# Patient Record
Sex: FEMALE | Race: WHITE | NOT HISPANIC OR LATINO | Employment: UNEMPLOYED | URBAN - METROPOLITAN AREA
[De-identification: names, ages, dates, MRNs, and addresses within clinical notes are randomized per-mention and may not be internally consistent; named-entity substitution may affect disease eponyms.]

---

## 2019-08-19 ENCOUNTER — HOSPITAL ENCOUNTER (EMERGENCY)
Facility: CLINIC | Age: 29
Discharge: HOME | End: 2019-08-19
Attending: NURSE PRACTITIONER | Admitting: NURSE PRACTITIONER
Payer: COMMERCIAL

## 2019-08-19 VITALS
TEMPERATURE: 98.5 F | OXYGEN SATURATION: 98 % | DIASTOLIC BLOOD PRESSURE: 89 MMHG | SYSTOLIC BLOOD PRESSURE: 137 MMHG | RESPIRATION RATE: 16 BRPM | HEART RATE: 84 BPM

## 2019-08-19 DIAGNOSIS — R11.10 VOMITING: ICD-10-CM

## 2019-08-19 DIAGNOSIS — R42 DIZZINESS: ICD-10-CM

## 2019-08-19 DIAGNOSIS — R51.9 HEADACHE: ICD-10-CM

## 2019-08-19 LAB
ALBUMIN SERPL-MCNC: 3.7 G/DL (ref 3.4–5)
ALP SERPL-CCNC: 93 U/L (ref 40–150)
ALT SERPL W P-5'-P-CCNC: 25 U/L (ref 0–50)
ANION GAP SERPL CALCULATED.3IONS-SCNC: 1 MMOL/L (ref 3–14)
AST SERPL W P-5'-P-CCNC: 28 U/L (ref 0–45)
BASOPHILS # BLD AUTO: 0.1 10E9/L (ref 0–0.2)
BASOPHILS NFR BLD AUTO: 0.5 %
BILIRUB SERPL-MCNC: 0.4 MG/DL (ref 0.2–1.3)
BUN SERPL-MCNC: 14 MG/DL (ref 7–30)
CALCIUM SERPL-MCNC: 9.3 MG/DL (ref 8.5–10.1)
CHLORIDE SERPL-SCNC: 105 MMOL/L (ref 94–109)
CO2 SERPL-SCNC: 30 MMOL/L (ref 20–32)
CREAT SERPL-MCNC: 0.62 MG/DL (ref 0.52–1.04)
DIFFERENTIAL METHOD BLD: ABNORMAL
EOSINOPHIL # BLD AUTO: 0.2 10E9/L (ref 0–0.7)
EOSINOPHIL NFR BLD AUTO: 1.7 %
ERYTHROCYTE [DISTWIDTH] IN BLOOD BY AUTOMATED COUNT: 11.6 % (ref 10–15)
GFR SERPL CREATININE-BSD FRML MDRD: >90 ML/MIN/{1.73_M2}
GLUCOSE SERPL-MCNC: 120 MG/DL (ref 70–99)
HCT VFR BLD AUTO: 43.4 % (ref 35–47)
HGB BLD-MCNC: 14.5 G/DL (ref 11.7–15.7)
IMM GRANULOCYTES # BLD: 0.1 10E9/L (ref 0–0.4)
IMM GRANULOCYTES NFR BLD: 0.4 %
LIPASE SERPL-CCNC: 273 U/L (ref 73–393)
LYMPHOCYTES # BLD AUTO: 3.1 10E9/L (ref 0.8–5.3)
LYMPHOCYTES NFR BLD AUTO: 26 %
MCH RBC QN AUTO: 32.5 PG (ref 26.5–33)
MCHC RBC AUTO-ENTMCNC: 33.4 G/DL (ref 31.5–36.5)
MCV RBC AUTO: 97 FL (ref 78–100)
MONOCYTES # BLD AUTO: 0.7 10E9/L (ref 0–1.3)
MONOCYTES NFR BLD AUTO: 5.7 %
NEUTROPHILS # BLD AUTO: 7.9 10E9/L (ref 1.6–8.3)
NEUTROPHILS NFR BLD AUTO: 65.7 %
NRBC # BLD AUTO: 0 10*3/UL
NRBC BLD AUTO-RTO: 0 /100
PLATELET # BLD AUTO: 323 10E9/L (ref 150–450)
POTASSIUM SERPL-SCNC: ABNORMAL MMOL/L (ref 3.4–5.3)
PROT SERPL-MCNC: 7.6 G/DL (ref 6.8–8.8)
RBC # BLD AUTO: 4.46 10E12/L (ref 3.8–5.2)
SODIUM SERPL-SCNC: 136 MMOL/L (ref 133–144)
TSH SERPL DL<=0.005 MIU/L-ACNC: 3.35 MU/L (ref 0.4–4)
WBC # BLD AUTO: 12.1 10E9/L (ref 4–11)

## 2019-08-19 PROCEDURE — 85025 COMPLETE CBC W/AUTO DIFF WBC: CPT | Performed by: NURSE PRACTITIONER

## 2019-08-19 PROCEDURE — 96374 THER/PROPH/DIAG INJ IV PUSH: CPT

## 2019-08-19 PROCEDURE — 99284 EMERGENCY DEPT VISIT MOD MDM: CPT

## 2019-08-19 PROCEDURE — 84443 ASSAY THYROID STIM HORMONE: CPT | Performed by: NURSE PRACTITIONER

## 2019-08-19 PROCEDURE — 83690 ASSAY OF LIPASE: CPT | Performed by: NURSE PRACTITIONER

## 2019-08-19 PROCEDURE — 80053 COMPREHEN METABOLIC PANEL: CPT | Performed by: NURSE PRACTITIONER

## 2019-08-19 PROCEDURE — 25000128 H RX IP 250 OP 636: Performed by: NURSE PRACTITIONER

## 2019-08-19 RX ORDER — LORAZEPAM 2 MG/ML
0.5 INJECTION INTRAMUSCULAR ONCE
Status: DISCONTINUED | OUTPATIENT
Start: 2019-08-19 | End: 2019-08-19 | Stop reason: HOSPADM

## 2019-08-19 RX ADMIN — PROCHLORPERAZINE EDISYLATE 10 MG: 5 INJECTION INTRAMUSCULAR; INTRAVENOUS at 18:49

## 2019-08-19 RX ADMIN — SODIUM CHLORIDE 1000 ML: 9 INJECTION, SOLUTION INTRAVENOUS at 18:51

## 2019-08-19 ASSESSMENT — ENCOUNTER SYMPTOMS
DIAPHORESIS: 1
VOMITING: 1
HEADACHES: 1
DIZZINESS: 1
ABDOMINAL PAIN: 0
DIARRHEA: 0
NAUSEA: 1

## 2019-08-19 NOTE — ED TRIAGE NOTES
Patient sent to the ED from urgent care for double vision. Patient states over the weekend has had troubles with intermittent double vision and hot flashes. Today double vision has been more constant and has had increased hot flashes and vomiting. Eating burger jonathan on arrival. No emesis.

## 2019-08-19 NOTE — ED PROVIDER NOTES
"  History     Chief Complaint:  Lightheadedness; Visual Disturbance    HPI   Kanika Walsh is a 28 year old female with a history of anxiety who presents with lightheadedness and visual disturbance. She states that she has had rashes on her right arm that have since improved. The patient reports that she has been having hot flashes, diaphoresis, and vomiting for the past two weeks that are getting progressively worse. She said that, today, she has \"vomited 20 times\" and is experiencing diplopia today, prompting her visit to the ED. She reports that she does not have diplopia here in the ED, but she is nauseous and dizzy. She also remarks that she has a mild headache on the right side of her head. She said that she was given Zofran from her headaches, but stopped taking it because it did not help. The patient denies abdominal pain or diarrhea. She denies drinking alcohol, medication changes, as well the possibility of being pregnant. The patient remarks that she fell at work recently but denies head trauma. She also states that she was vomiting while camping this weekend and tried marijuana, which helped slightly.     Allergies:  Latex     Medications:    Zofran  Cyclobenzaprine HCL    Robaxin  Medrol dosepak  Sertraline HCL  Tramadol HCL    Past Medical History:    Anxiety    Past Surgical History:    Abdomen surgery  Cholecystectomy     Family History:    No past pertinent family history.    Social History:  Smoking Status: Never Smoker  Smokeless Tobacco: Never Used  Alcohol Use: Negative  Drug Use: Positive, marijuana   Marital Status:         Review of Systems   Constitutional: Positive for diaphoresis.   HENT: Negative for congestion.    Eyes: Positive for visual disturbance. Negative for photophobia.   Respiratory: Negative for shortness of breath.    Cardiovascular: Negative for chest pain and palpitations.   Gastrointestinal: Positive for nausea and vomiting. Negative for abdominal pain and " diarrhea.   Genitourinary: Negative for dysuria, urgency and vaginal bleeding.   Neurological: Positive for dizziness and headaches. Negative for seizures, weakness and numbness.   All other systems reviewed and are negative.      Physical Exam     Patient Vitals for the past 24 hrs:   BP Temp Pulse Resp SpO2   08/19/19 1646 137/89 98.5  F (36.9  C) 84 16 98 %     Physical Exam  General:  Alert, mild discomfort, actively retching, well kept  HEENT:  Normal Voice, PERRL, EOM normal, oropharynx is normal, Uvula is midline, Conjunctivae and sclerae are normal, No lymphadenopathy    Neck: Normal range of motion, No meningismus. There is no midline cervical spine pain/tenderness. No mass is detected  CV:  Regular rate and underlying rhythm, Normal Peripheral pulses. No murmurs, rubs or clicks  Resp: Lungs are clear, No tachypnea, Non-labored breathing, No wheezing, crackles, or rales   GI:  Abdomen is soft, there is no rigidity, No distension, No tympani, No rebound tenderness, Non-surgical without peritoneal features    MS:  No major joint effusions, No asymmetric leg swelling. No calf tenderness  Skin:  No rash or acute skin lesions noted, Warm, Dry  Neuro:    National Institutes of Health Stroke Scale   Score    Level of consciousness: 0    LOC questions: 0    LOC commands: 0    Best gaze: 0    Visual: 0    Facial palsy: 0    Motor arm (left): 0    Motor arm (right): 0    Motor leg (left): 0    Motor leg (right): 0    Limb ataxia: 0    Sensory: 0    Best language: 0    Dysarthria: 0    Extinction and inattention: 0        Total Score:  0   Psych:  Awake. Alert.  Normal affect.  Appropriate interactions. Good eye contact    Emergency Department Course     Laboratory:  CBC: WBC: 12.1 (H), HGB 14.5, PLT: 323    Lipase: 273    TSH with free T4 reflex: 3.35    CMP: Glucose 120 (H), Anion Gap 1 (L), o/w WNL (Creatinine: 0.62)    Potassium: pending at time of patient's departure    Interventions:  1851 NS 1000 mL IV  Bolus  1849 Compazine 10 mg injection    Emergency Department Course:  Past medical records, nursing notes, and vitals reviewed.  1809: I performed an exam of the patient and obtained history, as documented above.    1914 I rechecked and updated the patient.    IV was inserted and blood was drawn for laboratory testing, results above.    1919 The patient is leaving Against Medical Advice:    At this point, the patient refused my recommended care and insisted upon discharge. I discussed with the patient and family my diagnostic impression, recommended treatment, and alternatives to treatment. I discussed with them what I anticipated could happen if the patient were discharged. The patient was able to understand this explanation and ask appropriate questions. The patient indicates s/he wants to leave because of refusal of imaging.  I believe this patient does have capacity to decide to leave Against Medical Advice, and I have asked her to sign a form indicating that decision. I have given the patient thorough discharge instructions and have prescribed all appropriate treatment, given the stage of the work up conducted.  I have reminded the patient to return here at any time if she decides to have further evaluation or treatment.    I personally reviewed the laboratory results with the Patient and answered all related questions prior to leaving AMA.    Impression & Plan      Medical Decision Making:  Kanika Walsh is a 28 year old female who presents today for evaluation of nausea vomiting and dizziness.  She states she has had some intermittent symptoms over the last couple of weeks however over the last day it has significantly worsened including double vision.  Her examination showed no focal neurologic deficit.  She did not have any visual disturbances my exam.  Due to the duration of her symptoms and the inability to provoke symptoms with head movement I was concerned about CVA.  After discussing plan for  evaluation of patient and need for MRI studies were initiated however patient as she was being wheeled down to MRI decided that she did not want any further evaluation or testing.  She stated she was feeling significantly improved and did not feel that further testing would help.  I had a very thorough discussion including life or death as well as loss of use of limb or speech conversation about the possibility of a CVA.  Patient accepts the responsibility for this and request to be discharged home.  Her laboratory studies showed minimally elevated white count and glucose.  Labs were otherwise noncontributory.  Again patient has signed paperwork in his left AMA.      Diagnosis:    ICD-10-CM    1. Dizziness R42    2. Vomiting R11.10    3. Headache R51        Disposition:  Left AMA.    IRosanne, am serving as a scribe on 8/19/2019 at 7:19 PM to personally document services performed by Jhonatan Park APRN* based on my observations and the provider's statements to me.     IBarrera, am serving as a scribe on 8/19/2019 at 7:47 PM to personally document services performed by No att. providers found based on my observations and the provider's statements to me.     Rosanne Blevins  8/19/2019   [facility] EMERGENCY DEPARTMENT       Jhonatan Park APRN CNP  08/20/19 0232

## 2019-08-20 ASSESSMENT — ENCOUNTER SYMPTOMS
WEAKNESS: 0
NUMBNESS: 0
SHORTNESS OF BREATH: 0
SEIZURES: 0
DYSURIA: 0
PALPITATIONS: 0
PHOTOPHOBIA: 0

## 2019-08-20 NOTE — ED NOTES
Patient states she does not want to follow through with any more testing and wishes to leave now. Her provider Jhonatan Emanuels to the room to discuss with patient who maintains she is feeling better and does not wish to receive any further testing.

## 2022-07-18 ENCOUNTER — HOSPITAL ENCOUNTER (EMERGENCY)
Facility: CLINIC | Age: 32
Discharge: HOME | End: 2022-07-18
Attending: EMERGENCY MEDICINE | Admitting: EMERGENCY MEDICINE
Payer: COMMERCIAL

## 2022-07-18 VITALS
HEART RATE: 88 BPM | DIASTOLIC BLOOD PRESSURE: 94 MMHG | SYSTOLIC BLOOD PRESSURE: 117 MMHG | OXYGEN SATURATION: 100 % | TEMPERATURE: 97.5 F | RESPIRATION RATE: 18 BRPM

## 2022-07-18 DIAGNOSIS — R10.84 ABDOMINAL PAIN, GENERALIZED: ICD-10-CM

## 2022-07-18 DIAGNOSIS — R11.2 NAUSEA AND VOMITING, INTRACTABILITY OF VOMITING NOT SPECIFIED, UNSPECIFIED VOMITING TYPE: ICD-10-CM

## 2022-07-18 DIAGNOSIS — F12.90 MARIJUANA USE: ICD-10-CM

## 2022-07-18 LAB
ALBUMIN SERPL-MCNC: 4 G/DL (ref 3.4–5)
ALP SERPL-CCNC: 86 U/L (ref 40–150)
ALT SERPL W P-5'-P-CCNC: 30 U/L (ref 0–50)
ANION GAP SERPL CALCULATED.3IONS-SCNC: 7 MMOL/L (ref 3–14)
AST SERPL W P-5'-P-CCNC: 23 U/L (ref 0–45)
BASOPHILS # BLD AUTO: 0 10E3/UL (ref 0–0.2)
BASOPHILS NFR BLD AUTO: 0 %
BILIRUB SERPL-MCNC: 0.6 MG/DL (ref 0.2–1.3)
BUN SERPL-MCNC: 13 MG/DL (ref 7–30)
CALCIUM SERPL-MCNC: 9.9 MG/DL (ref 8.5–10.1)
CHLORIDE BLD-SCNC: 105 MMOL/L (ref 94–109)
CO2 SERPL-SCNC: 28 MMOL/L (ref 20–32)
CREAT SERPL-MCNC: 0.66 MG/DL (ref 0.52–1.04)
EOSINOPHIL # BLD AUTO: 0.2 10E3/UL (ref 0–0.7)
EOSINOPHIL NFR BLD AUTO: 2 %
ERYTHROCYTE [DISTWIDTH] IN BLOOD BY AUTOMATED COUNT: 11.1 % (ref 10–15)
FLUAV RNA SPEC QL NAA+PROBE: NEGATIVE
FLUBV RNA RESP QL NAA+PROBE: NEGATIVE
GFR SERPL CREATININE-BSD FRML MDRD: >90 ML/MIN/1.73M2
GLUCOSE BLD-MCNC: 92 MG/DL (ref 70–99)
HCG SERPL QL: NEGATIVE
HCT VFR BLD AUTO: 44.7 % (ref 35–47)
HGB BLD-MCNC: 15.1 G/DL (ref 11.7–15.7)
HOLD SPECIMEN: NORMAL
IMM GRANULOCYTES # BLD: 0 10E3/UL
IMM GRANULOCYTES NFR BLD: 0 %
LIPASE SERPL-CCNC: 255 U/L (ref 73–393)
LYMPHOCYTES # BLD AUTO: 2.1 10E3/UL (ref 0.8–5.3)
LYMPHOCYTES NFR BLD AUTO: 22 %
MCH RBC QN AUTO: 33 PG (ref 26.5–33)
MCHC RBC AUTO-ENTMCNC: 33.8 G/DL (ref 31.5–36.5)
MCV RBC AUTO: 98 FL (ref 78–100)
MONOCYTES # BLD AUTO: 0.8 10E3/UL (ref 0–1.3)
MONOCYTES NFR BLD AUTO: 9 %
NEUTROPHILS # BLD AUTO: 6.2 10E3/UL (ref 1.6–8.3)
NEUTROPHILS NFR BLD AUTO: 67 %
NRBC # BLD AUTO: 0 10E3/UL
NRBC BLD AUTO-RTO: 0 /100
PLATELET # BLD AUTO: 279 10E3/UL (ref 150–450)
POTASSIUM BLD-SCNC: 4 MMOL/L (ref 3.4–5.3)
PROT SERPL-MCNC: 7.8 G/DL (ref 6.8–8.8)
RBC # BLD AUTO: 4.58 10E6/UL (ref 3.8–5.2)
RSV RNA SPEC NAA+PROBE: NEGATIVE
SARS-COV-2 RNA RESP QL NAA+PROBE: NEGATIVE
SODIUM SERPL-SCNC: 140 MMOL/L (ref 133–144)
WBC # BLD AUTO: 9.3 10E3/UL (ref 4–11)

## 2022-07-18 PROCEDURE — 80053 COMPREHEN METABOLIC PANEL: CPT | Performed by: EMERGENCY MEDICINE

## 2022-07-18 PROCEDURE — 85025 COMPLETE CBC W/AUTO DIFF WBC: CPT | Performed by: EMERGENCY MEDICINE

## 2022-07-18 PROCEDURE — 87637 SARSCOV2&INF A&B&RSV AMP PRB: CPT

## 2022-07-18 PROCEDURE — 36415 COLL VENOUS BLD VENIPUNCTURE: CPT | Performed by: EMERGENCY MEDICINE

## 2022-07-18 PROCEDURE — 83690 ASSAY OF LIPASE: CPT | Performed by: EMERGENCY MEDICINE

## 2022-07-18 PROCEDURE — 82040 ASSAY OF SERUM ALBUMIN: CPT | Performed by: EMERGENCY MEDICINE

## 2022-07-18 PROCEDURE — 99283 EMERGENCY DEPT VISIT LOW MDM: CPT | Mod: CS

## 2022-07-18 PROCEDURE — 250N000011 HC RX IP 250 OP 636: Performed by: EMERGENCY MEDICINE

## 2022-07-18 PROCEDURE — C9803 HOPD COVID-19 SPEC COLLECT: HCPCS

## 2022-07-18 PROCEDURE — 258N000003 HC RX IP 258 OP 636: Performed by: EMERGENCY MEDICINE

## 2022-07-18 PROCEDURE — 84703 CHORIONIC GONADOTROPIN ASSAY: CPT

## 2022-07-18 RX ORDER — ONDANSETRON 2 MG/ML
4 INJECTION INTRAMUSCULAR; INTRAVENOUS ONCE
Status: COMPLETED | OUTPATIENT
Start: 2022-07-18 | End: 2022-07-18

## 2022-07-18 RX ORDER — ONDANSETRON 4 MG/1
4 TABLET, ORALLY DISINTEGRATING ORAL EVERY 8 HOURS PRN
Qty: 10 TABLET | Refills: 0 | Status: SHIPPED | OUTPATIENT
Start: 2022-07-18

## 2022-07-18 RX ADMIN — SODIUM CHLORIDE 1000 ML: 9 INJECTION, SOLUTION INTRAVENOUS at 15:39

## 2022-07-18 RX ADMIN — ONDANSETRON 4 MG: 2 INJECTION INTRAMUSCULAR; INTRAVENOUS at 15:39

## 2022-07-18 ASSESSMENT — ENCOUNTER SYMPTOMS
SHORTNESS OF BREATH: 0
FEVER: 0
COUGH: 0
ABDOMINAL PAIN: 1
NAUSEA: 1
DYSURIA: 0
CONSTIPATION: 0
FLANK PAIN: 0
BLOOD IN STOOL: 0
VOMITING: 1
BACK PAIN: 0

## 2022-07-18 NOTE — ED TRIAGE NOTES
Pt presents with right sided abdominal pain with concern for pancreatitis, also c/o vomiting that began this morning. Past hx of pancreatitis after gallbladder removal. Pt actively dry heaving and vomiting during triage. Pt alert, oriented x3 aBCs intact     Triage Assessment     Row Name 07/18/22 1531       Triage Assessment (Adult)    Airway WDL WDL       Respiratory WDL    Respiratory WDL WDL       Skin Circulation/Temperature WDL    Skin Circulation/Temperature WDL WDL       Cardiac WDL    Cardiac WDL WDL       Peripheral/Neurovascular WDL    Peripheral Neurovascular WDL WDL

## 2022-07-19 NOTE — ED PROVIDER NOTES
History   Chief Complaint:  Abdominal Pain       HPI   Kanika Walsh is a 31 year old female with a history of pancreatitis who presents with abdominal pain, nausea and vomiting.  Patient has a past abdominal surgical history of cholecystectomy and 2 C-sections.  She states she felt a flare of abdominal pain for the past 4 days.  However, yesterday the pain worsened.  She points to her right upper quadrant below her rib cage as to where she is having the worst pain.  She began to vomit at 5:15 AM until about 2 hours ago.  She has had a couple loose bowel movements.  No blood in her stool or in her vomit.  She describes her abdominal pain as a dull pain and states it is worse with eating and worse with movement.  No recent travel.  No recent antibiotic use.  She denies any alcohol use. Uses marijuana regularly.  Denies any chest pain or shortness of breath.  No recent coughs, colds, fevers.  No urinary symptoms.    Review of Systems   Constitutional: Negative for fever.   HENT: Negative for congestion.    Respiratory: Negative for cough and shortness of breath.    Cardiovascular: Negative for chest pain and leg swelling.   Gastrointestinal: Positive for abdominal pain, nausea and vomiting. Negative for blood in stool and constipation.   Genitourinary: Negative for dysuria, flank pain, menstrual problem, pelvic pain and urgency.   Musculoskeletal: Negative for back pain.   Skin: Negative for rash.   Neurological: Negative for syncope.   All other systems reviewed and are negative.    Allergies:  No known drug allergies     Medications:  Sertraline  Alprazolam  Adderall  Levothyroxine    Past Medical History:     Attention deficit disorder  Diplopia  Excessive and frequent menstruation with irregular cycle  Insomnia  Pancreatitis   Hypothyroidism  Pure hypercholesterolemia  Generalized anxiety disorder     Past Surgical History:    Cholecystectomy      Social History:    Denies alcohol or drug use  Uses  marijuana  PCP: Rigoberto Chatman MD    Physical Exam     Patient Vitals for the past 24 hrs:   BP Temp Temp src Pulse Resp SpO2   07/18/22 2153 121/68 -- -- 86 20 98 %   07/18/22 1531 101/81 97.5  F (36.4  C) Temporal 100 16 96 %       Physical Exam  General: Friendly adult female sitting on [facility].  HENT: Patient wearing face mask. When taken off, mucous membranes appear moist.  Eyes: Conjunctive and sclera clear.  CV: Regular rate and rhythm. Normal S1, S2. No appreciable murmurs, gallops or rubs.  Resp: Lungs clear to auscultation bilaterally. Normal respiratory effort. Speaks in full sentences. No stridor or cough observed.  GI: Abdomen soft, non distended and nontender. No rebound or guarding.  MSK: Moves all extremities without difficulty. No lower extremity edema.  Skin: Warm and dry.  Neuro: Awake, alert, oriented. Cranial nerves grossly intact.  Psych: Cooperative. Normal affect.      Emergency Department Course     Laboratory:  Labs Ordered and Resulted from Time of ED Arrival to Time of ED Departure   LIPASE - Normal       Result Value    Lipase 255     COMPREHENSIVE METABOLIC PANEL - Normal    Sodium 140      Potassium 4.0      Chloride 105      Carbon Dioxide (CO2) 28      Anion Gap 7      Urea Nitrogen 13      Creatinine 0.66      Calcium 9.9      Glucose 92      Alkaline Phosphatase 86      AST 23      ALT 30      Protein Total 7.8      Albumin 4.0      Bilirubin Total 0.6      GFR Estimate >90     HCG QUALITATIVE PREGNANCY - Normal    hCG Serum Qualitative Negative     CBC WITH PLATELETS AND DIFFERENTIAL    WBC Count 9.3      RBC Count 4.58      Hemoglobin 15.1      Hematocrit 44.7      MCV 98      MCH 33.0      MCHC 33.8      RDW 11.1      Platelet Count 279      % Neutrophils 67      % Lymphocytes 22      % Monocytes 9      % Eosinophils 2      % Basophils 0      % Immature Granulocytes 0      NRBCs per 100 WBC 0      Absolute Neutrophils 6.2      Absolute Lymphocytes 2.1      Absolute  Monocytes 0.8      Absolute Eosinophils 0.2      Absolute Basophils 0.0      Absolute Immature Granulocytes 0.0      Absolute NRBCs 0.0     INFLUENZA A/B & SARS-COV2 PCR MULTIPLEX        Emergency Department Course:    Reviewed:  I reviewed nursing notes, vitals, past medical history and Care Everywhere    Assessments:  2137 I obtained history and examined the patient as noted above.     1023    I rechecked the patient and explained findings.     Interventions:  Medications   0.9% sodium chloride BOLUS (0 mLs Intravenous Stopped 7/18/22 2213)   ondansetron (ZOFRAN) injection 4 mg (4 mg Intravenous Given 7/18/22 1539)     Disposition:  The patient was discharged to home.     Impression & Plan     Medical Decision Making:  Kanika is a pleasant 31-year-old female with a past medical history of pancreatitis who is status post cholecystectomy and presented tonight to the emergency department for evaluation of abdominal pain, nausea and vomiting.  On arrival vital signs were unremarkable.  On exam, she had a benign abdomen with no focal tenderness.  Basic lab studies were obtained.  Pregnancy test negative.  CBC with no leukocytosis or anemia.  Normal kidney function, liver function and electrolytes on metabolic panel.  Lipase was normal, reassuring she does not have another episode of pancreatitis.  COVID test pending and she will check this on [facility].  Given her benign abdominal exam, I did not pursue advanced imaging, as I felt the risk of radiation outweighed the benefit.  The patient was able to tolerate oral intake after Zofran here in the emergency department.  I prescribed her a course of Zofran to be used as an outpatient.  Her nausea and vomiting could likely be due to gastroenteritis or potentially even related to her regular marijuana use.  I felt she was stable for discharge home.  We discussed close return precautions and she will return to the emergency department if she develops fever, worsening/focal  abdominal pain, blood in her stools, worsening nausea and vomiting not responsive to Zofran, or anything else new or concerning.  All questions were answered prior to discharge.  My attending [facility]    Diagnosis:    ICD-10-CM    1. Abdominal pain, generalized  R10.84    2. Nausea and vomiting, intractability of vomiting not specified, unspecified vomiting type  R11.2    3. Marijuana use  F12.90        Discharge Medications:  New Prescriptions    ONDANSETRON (ZOFRAN ODT) 4 MG ODT TAB    Take 1 tablet (4 mg) by mouth every 8 hours as needed for nausea       Scribe Disclosure:  I, Mihaela Spivey, am serving as a scribe at 9:37 PM on 7/18/2022 to document services personally performed by Arlette Yin PA-C based on my observations and the provider's statements to me.     Arlette Yin PA-C  EPPA APC-T  I saw and evaluated this patient under attending provider Dr. Fischer.           Arlette Yin PA-C  07/18/22 3233

## 2022-07-19 NOTE — ED PROVIDER NOTES
ED ATTENDING PHYSICIAN NOTE:   I evaluated this patient in conjunction with RASHI Muñoz. I have participated in the care of the patient and personally performed key elements of the history, exam, and medical decision making.      HPI:   Kanika Walsh is a 31 year old female presents with concerns for pancreatitis given RUQ abdominal pain and nausea that has been very minimal over the last 4-5 days, but worsened yesterday. She began vomiting at 0515 this morning, and her last episode was about 2 hours prior to her arrival here. The abdominal pain is constant and dull and worsens with eating and movement. She has had slightly looser bowel movements, but there is no blood in her stool. Her abdomen is not distended and she is passing gas. She has a history of pancreatitis after her cholecystectomy.    EXAM:   BP (!) 117/94   Pulse 88   Temp 97.5  F (36.4  C) (Temporal)   Resp 18   SpO2 100%   General: Resting comfortably on the gurney  Head:  The scalp, face, and head appear normal  Eyes:  The pupils are normal    Conjunctivae and sclera appear normal  ENT:    The nose is normal    Ears/pinnae are normal  Neck:  Normal range of motion  Resp:  The lungs are clear. No resp distress.  Abd:  No tenderness.  Abdomen is not distended.  No CVA tenderness.  Heart:  Regular rate, normal rhythm, no murmur  Skin:  No rash or acute lesions noted.  Neuro: Speech is normal and fluent.  No focal deficits.  Psych:  Awake. Alert.  Normal affect.  Appropriate interactions    Labs Ordered and Resulted from Time of ED Arrival to Time of ED Departure   LIPASE - Normal       Result Value    Lipase 255     COMPREHENSIVE METABOLIC PANEL - Normal    Sodium 140      Potassium 4.0      Chloride 105      Carbon Dioxide (CO2) 28      Anion Gap 7      Urea Nitrogen 13      Creatinine 0.66      Calcium 9.9      Glucose 92      Alkaline Phosphatase 86      AST 23      ALT 30      Protein Total 7.8      Albumin 4.0      Bilirubin Total  0.6      GFR Estimate >90     HCG QUALITATIVE PREGNANCY - Normal    hCG Serum Qualitative Negative     INFLUENZA A/B & SARS-COV2 PCR MULTIPLEX - Normal    Influenza A PCR Negative      Influenza B PCR Negative      RSV PCR Negative      SARS CoV2 PCR Negative     CBC WITH PLATELETS AND DIFFERENTIAL    WBC Count 9.3      RBC Count 4.58      Hemoglobin 15.1      Hematocrit 44.7      MCV 98      MCH 33.0      MCHC 33.8      RDW 11.1      Platelet Count 279      % Neutrophils 67      % Lymphocytes 22      % Monocytes 9      % Eosinophils 2      % Basophils 0      % Immature Granulocytes 0      NRBCs per 100 WBC 0      Absolute Neutrophils 6.2      Absolute Lymphocytes 2.1      Absolute Monocytes 0.8      Absolute Eosinophils 0.2      Absolute Basophils 0.0      Absolute Immature Granulocytes 0.0      Absolute NRBCs 0.0       No orders to display        MEDICAL DECISION MAKING/ASSESSMENT AND PLAN:   Kanika Walsh is a 31 year old female who presents for evaluation of nausea and vomiting with mild abdominal pain in a nonfocal abdominal exam. There are no ill contacts. I considered a broad differential diagnosis for this patient including viral gastroenteritis, food poisoning, bowel obstruction, intra-abdominal infection such as colitis, cholecystitis, UTI, pyelonephritis, appendicitis, etc.  Doubt new onset DKA. Doubt brain malignancy or increased ICP.     She no signs of worrisome intra-abdominal pathologies detected during the visit today. She has a completely benign abdominal exam without rebound, guarding, or marked tenderness to palpation. Supportive outpatient management is therefore indicated.  Abdominal pain precautions are given for home. No indication for CT at this time.  It was discussed to return to the ED for blood in stool, increasing pain, or fevers more than 102. She feels much improved after interventions in ED. She passed oral challenge prior to d/c.     DIAGNOSIS:     ICD-10-CM    1. Abdominal pain,  generalized  R10.84    2. Nausea and vomiting, intractability of vomiting not specified, unspecified vomiting type  R11.2    3. Marijuana use  F12.90         DISPOSITION:   The patient was discharged to home     7/18/2022  [facility] EMERGENCY DEPT           Ced Fischer MD  07/18/22 4175

## 2025-06-23 ENCOUNTER — HOSPITAL ENCOUNTER (EMERGENCY)
Facility: CLINIC | Age: 35
Discharge: HOME | End: 2025-06-23
Attending: EMERGENCY MEDICINE | Admitting: EMERGENCY MEDICINE
Payer: COMMERCIAL

## 2025-06-23 VITALS
RESPIRATION RATE: 20 BRPM | SYSTOLIC BLOOD PRESSURE: 133 MMHG | TEMPERATURE: 98.2 F | OXYGEN SATURATION: 97 % | HEART RATE: 63 BPM | DIASTOLIC BLOOD PRESSURE: 86 MMHG

## 2025-06-23 DIAGNOSIS — K52.9 ACUTE GASTROENTERITIS: ICD-10-CM

## 2025-06-23 LAB
ABO + RH BLD: NORMAL
ADV 40+41 DNA STL QL NAA+NON-PROBE: NEGATIVE
ALBUMIN SERPL BCG-MCNC: 4.8 G/DL (ref 3.5–5.2)
ALBUMIN UR-MCNC: NEGATIVE MG/DL
ALP SERPL-CCNC: 76 U/L (ref 40–150)
ALT SERPL W P-5'-P-CCNC: 40 U/L (ref 0–50)
ANION GAP SERPL CALCULATED.3IONS-SCNC: 17 MMOL/L (ref 7–15)
APPEARANCE UR: CLEAR
AST SERPL W P-5'-P-CCNC: 31 U/L (ref 0–45)
ASTRO TYP 1-8 RNA STL QL NAA+NON-PROBE: NEGATIVE
BACTERIA #/AREA URNS HPF: ABNORMAL /HPF
BASOPHILS # BLD AUTO: 0 10E3/UL (ref 0–0.2)
BASOPHILS NFR BLD AUTO: 0 %
BILIRUB SERPL-MCNC: 0.7 MG/DL
BILIRUB UR QL STRIP: NEGATIVE
BLD GP AB SCN SERPL QL: NEGATIVE
BUN SERPL-MCNC: 8.4 MG/DL (ref 6–20)
C CAYETANENSIS DNA STL QL NAA+NON-PROBE: NEGATIVE
CALCIUM SERPL-MCNC: 9.9 MG/DL (ref 8.8–10.4)
CAMPYLOBACTER DNA SPEC NAA+PROBE: NEGATIVE
CHLORIDE SERPL-SCNC: 105 MMOL/L (ref 98–107)
COLOR UR AUTO: ABNORMAL
CREAT SERPL-MCNC: 0.67 MG/DL (ref 0.51–0.95)
CRYPTOSP DNA STL QL NAA+NON-PROBE: NEGATIVE
E COLI O157 DNA STL QL NAA+NON-PROBE: NORMAL
E HISTOLYT DNA STL QL NAA+NON-PROBE: NEGATIVE
EAEC ASTA GENE ISLT QL NAA+PROBE: NEGATIVE
EC STX1+STX2 GENES STL QL NAA+NON-PROBE: NEGATIVE
EGFRCR SERPLBLD CKD-EPI 2021: >90 ML/MIN/1.73M2
EOSINOPHIL # BLD AUTO: 0.2 10E3/UL (ref 0–0.7)
EOSINOPHIL NFR BLD AUTO: 2 %
EPEC EAE GENE STL QL NAA+NON-PROBE: NEGATIVE
ERYTHROCYTE [DISTWIDTH] IN BLOOD BY AUTOMATED COUNT: 11.6 % (ref 10–15)
ETEC LTA+ST1A+ST1B TOX ST NAA+NON-PROBE: NEGATIVE
FLUAV RNA SPEC QL NAA+PROBE: NEGATIVE
FLUBV RNA RESP QL NAA+PROBE: NEGATIVE
G LAMBLIA DNA STL QL NAA+NON-PROBE: NEGATIVE
GLUCOSE SERPL-MCNC: 100 MG/DL (ref 70–99)
GLUCOSE UR STRIP-MCNC: NEGATIVE MG/DL
HCO3 SERPL-SCNC: 20 MMOL/L (ref 22–29)
HCT VFR BLD AUTO: 42 % (ref 35–47)
HGB BLD-MCNC: 15.1 G/DL (ref 11.7–15.7)
HGB UR QL STRIP: NEGATIVE
HOLD SPECIMEN: NORMAL
IMM GRANULOCYTES # BLD: 0 10E3/UL
IMM GRANULOCYTES NFR BLD: 0 %
INR PPP: 0.9 (ref 0.85–1.15)
KETONES UR STRIP-MCNC: NEGATIVE MG/DL
LEUKOCYTE ESTERASE UR QL STRIP: NEGATIVE
LYMPHOCYTES # BLD AUTO: 2.7 10E3/UL (ref 0.8–5.3)
LYMPHOCYTES NFR BLD AUTO: 24 %
MCH RBC QN AUTO: 33.3 PG (ref 26.5–33)
MCHC RBC AUTO-ENTMCNC: 36 G/DL (ref 31.5–36.5)
MCV RBC AUTO: 93 FL (ref 78–100)
MONOCYTES # BLD AUTO: 0.9 10E3/UL (ref 0–1.3)
MONOCYTES NFR BLD AUTO: 8 %
NEUTROPHILS # BLD AUTO: 7.4 10E3/UL (ref 1.6–8.3)
NEUTROPHILS NFR BLD AUTO: 66 %
NITRATE UR QL: NEGATIVE
NOROVIRUS GI+II RNA STL QL NAA+NON-PROBE: NEGATIVE
NRBC # BLD AUTO: 0 10E3/UL
NRBC BLD AUTO-RTO: 0 /100
P SHIGELLOIDES DNA STL QL NAA+NON-PROBE: NEGATIVE
PH UR STRIP: 6.5 [PH] (ref 5–7)
PLATELET # BLD AUTO: 356 10E3/UL (ref 150–450)
POTASSIUM SERPL-SCNC: 3.4 MMOL/L (ref 3.4–5.3)
PROT SERPL-MCNC: 7.7 G/DL (ref 6.4–8.3)
PROTHROMBIN TIME: 12.3 SECONDS (ref 11.8–14.8)
RBC # BLD AUTO: 4.53 10E6/UL (ref 3.8–5.2)
RBC URINE: 1 /HPF
RSV RNA SPEC NAA+PROBE: NEGATIVE
RVA RNA STL QL NAA+NON-PROBE: NEGATIVE
SALMONELLA SP RPOD STL QL NAA+PROBE: NEGATIVE
SAPO I+II+IV+V RNA STL QL NAA+NON-PROBE: NEGATIVE
SARS-COV-2 RNA RESP QL NAA+PROBE: NEGATIVE
SHIGELLA SP+EIEC IPAH ST NAA+NON-PROBE: NEGATIVE
SODIUM SERPL-SCNC: 142 MMOL/L (ref 135–145)
SP GR UR STRIP: 1 (ref 1–1.03)
SPECIMEN EXP DATE BLD: NORMAL
SQUAMOUS EPITHELIAL: 1 /HPF
UROBILINOGEN UR STRIP-MCNC: NORMAL MG/DL
V CHOLERAE DNA SPEC QL NAA+PROBE: NEGATIVE
VIBRIO DNA SPEC NAA+PROBE: NEGATIVE
WBC # BLD AUTO: 11.2 10E3/UL (ref 4–11)
WBC URINE: 1 /HPF
Y ENTEROCOL DNA STL QL NAA+PROBE: NEGATIVE

## 2025-06-23 PROCEDURE — 258N000003 HC RX IP 258 OP 636

## 2025-06-23 PROCEDURE — 87507 IADNA-DNA/RNA PROBE TQ 12-25: CPT

## 2025-06-23 PROCEDURE — 96374 THER/PROPH/DIAG INJ IV PUSH: CPT

## 2025-06-23 PROCEDURE — 250N000011 HC RX IP 250 OP 636: Performed by: EMERGENCY MEDICINE

## 2025-06-23 PROCEDURE — 86900 BLOOD TYPING SEROLOGIC ABO: CPT | Performed by: EMERGENCY MEDICINE

## 2025-06-23 PROCEDURE — 85004 AUTOMATED DIFF WBC COUNT: CPT | Performed by: EMERGENCY MEDICINE

## 2025-06-23 PROCEDURE — 96361 HYDRATE IV INFUSION ADD-ON: CPT

## 2025-06-23 PROCEDURE — 36415 COLL VENOUS BLD VENIPUNCTURE: CPT | Performed by: EMERGENCY MEDICINE

## 2025-06-23 PROCEDURE — 85610 PROTHROMBIN TIME: CPT | Performed by: EMERGENCY MEDICINE

## 2025-06-23 PROCEDURE — 81003 URINALYSIS AUTO W/O SCOPE: CPT

## 2025-06-23 PROCEDURE — 87637 SARSCOV2&INF A&B&RSV AMP PRB: CPT

## 2025-06-23 PROCEDURE — 99284 EMERGENCY DEPT VISIT MOD MDM: CPT | Mod: 25

## 2025-06-23 PROCEDURE — 80053 COMPREHEN METABOLIC PANEL: CPT | Performed by: EMERGENCY MEDICINE

## 2025-06-23 RX ORDER — ONDANSETRON 4 MG/1
4 TABLET, ORALLY DISINTEGRATING ORAL EVERY 6 HOURS PRN
Qty: 10 TABLET | Refills: 0 | Status: SHIPPED | OUTPATIENT
Start: 2025-06-23 | End: 2025-06-26

## 2025-06-23 RX ORDER — ONDANSETRON 2 MG/ML
4 INJECTION INTRAMUSCULAR; INTRAVENOUS ONCE
Status: COMPLETED | OUTPATIENT
Start: 2025-06-23 | End: 2025-06-23

## 2025-06-23 RX ADMIN — SODIUM CHLORIDE 1000 ML: 0.9 INJECTION, SOLUTION INTRAVENOUS at 09:39

## 2025-06-23 RX ADMIN — ONDANSETRON 4 MG: 2 INJECTION, SOLUTION INTRAMUSCULAR; INTRAVENOUS at 11:18

## 2025-06-23 ASSESSMENT — ACTIVITIES OF DAILY LIVING (ADL)
ADLS_ACUITY_SCORE: 41

## 2025-06-23 ASSESSMENT — COLUMBIA-SUICIDE SEVERITY RATING SCALE - C-SSRS
1. IN THE PAST MONTH, HAVE YOU WISHED YOU WERE DEAD OR WISHED YOU COULD GO TO SLEEP AND NOT WAKE UP?: NO
6. HAVE YOU EVER DONE ANYTHING, STARTED TO DO ANYTHING, OR PREPARED TO DO ANYTHING TO END YOUR LIFE?: NO
2. HAVE YOU ACTUALLY HAD ANY THOUGHTS OF KILLING YOURSELF IN THE PAST MONTH?: NO

## 2025-06-23 NOTE — ED TRIAGE NOTES
Patient coming in with nausea, vomiting and rectal bleeding. States everything started yesterday. Just returned from [place] on Saturday. ABCs intact.      Triage Assessment (Adult)       Row Name 06/23/25 0821          Triage Assessment    Airway WDL WDL        Respiratory WDL    Respiratory WDL WDL        Skin Circulation/Temperature WDL    Skin Circulation/Temperature WDL WDL        Cardiac WDL    Cardiac WDL WDL        Peripheral/Neurovascular WDL    Peripheral Neurovascular WDL WDL        Cognitive/Neuro/Behavioral WDL    Cognitive/Neuro/Behavioral WDL WDL

## 2025-06-23 NOTE — ED PROVIDER NOTES
ED APC SUPERVISION NOTE:   I evaluated this patient in conjunction with Dr. Jani BERNARD, I have participated in the care of the patient and personally performed key elements of the history, exam, and medical decision making.      HPI:   Kanika Walsh is a 34 year old female with a history of anxiety, hypothyroidism, and hypercholesteremia who presents to the ED for hematochezia, nausea, vomiting, and diarrhea. The patient reports that she was in [place] until 2 days ago. Upon waking up the next morning the patient felt abdominal pain from bloating and pressure as well as fever, chills, and muscle aches. The patient went and took a 5hr nap from exhaustion and woke up the next morning with lots of blood in the stool The patient went to a grad party, passed out twice, and then had another bowel movement that was primary blood. The patient has been vomiting, but notes no blood. The patient also notes that she has been staying hydrated. She has had her gallbladder removed and 2 c-sections. The patient denies fall, dysuria, and discharge.       Independent Historian:   None    Review of External Notes: 6/13/25: Clinic note reviewed      EXAM:   General: No acute distress  Head: No obvious trauma to head.  Ears, Nose, Throat:  External ears normal.  Nose normal.  No pharyngeal erythema, swelling or exudate.  Midline uvula. Moist mucus membranes.  Eyes:  Conjunctivae clear.   Neck: Normal range of motion.  Neck supple.   CV: Regular rate and rhythm.  No murmurs.      Respiratory: Effort normal and breath sounds normal.  No wheezing or crackles.   Gastrointestinal: Soft.  No distension. There is no tenderness.  There is no rigidity, no rebound and no guarding.   Musculoskeletal: Normal range of motion.  Non tender extremities to palpations. No lower extremity edema  Neuro: Alert. Moving all extremities appropriately.  Normal speech.    Skin: Skin is warm and dry.  No rash noted.   Psych: Normal mood and affect. Behavior is  normal.      Independent Interpretation (X-rays, CTs, rhythm strip):  None    Consultations/Discussion of Management or Tests:  None     MIPS:      None    MEDICAL DECISION MAKING/ASSESSMENT AND PLAN:   Patient presents with nausea, vomiting and bloody diarrhea. She is initially tachycardic but this spontaneously resolves. She is given 1L NS bolus and zofran. She has no abdominal tenderness on exam. CBC, CMP are grossly unremarkable. UA is negative for UTI. Viral swab is negative. Enteric stool panel is negative. She reports she is feeling better after the fluids and zofran. She appears well and is medically cleared to discharge. She is given a prescription for zofran. She has an appointment with her PCP already. Return precautions are given and she verbalizes understanding. She is discharged home in stable condition.     DIAGNOSIS:     ICD-10-CM    1. Acute gastroenteritis  K52.9             Scribe Disclosure:  I, Samiedelmira Elliott, am serving as a scribe at 8:36 AM on 6/23/2025 to document services personally performed by Leon Mcgee MD based on my observations and the provider's statements to me.   6/23/2025  [facility] EMERGENCY DEPT      Leon Mcgee MD  06/23/25 4062

## 2025-06-23 NOTE — ED PROVIDER NOTES
Emergency Department Note      History of Present Illness     Chief Complaint   Rectal Bleeding and Nausea, Vomiting, & Diarrhea    HPI   Kanika Walsh is a 34 year old female with significant past medical history of anxiety and ADHD presents to the ED for evaluation for nausea, vomiting, and blood in stools started yesterday after returning from [place] on Saturday. Reports symptom worsen yesterday afternoon after noticing blood in stool. She mentions having 3 total diarrhea episode with two having blood in them. She endorses fevers, chills, muscle aches, and lower abdominal pain. She mentions she has been drinking a lot of water. She is still able to tolerate solids. She reports she only consumed bottle haile in [place] but did swim in the pool/ocean. No blood in vomit. Denies any burning with urination, discharge, or concerns for rectal fissure. No history of hemorrhoids. No sick contacts and denies being around anyone that is sick. Report minimal alcohol use and no forceful vomiting.     Independent Historian   None    Review of External Notes   None     Past Medical History     Medical History and Problem List   Past Medical History:   Diagnosis Date    Anxiety        Medications   ALPRAZolam (XANAX PO)  CYCLOBENZAPRINE HCL PO  ibuprofen (ADVIL,MOTRIN) 200 MG tablet  methocarbamol (ROBAXIN) 750 MG tablet  methylPREDNISolone (MEDROL DOSEPAK) 4 MG tablet  ondansetron (ZOFRAN ODT) 4 MG ODT tab  oxyCODONE-acetaminophen (PERCOCET) 5-325 MG per tablet  SERTRALINE HCL PO  TRAMADOL HCL PO        Surgical History   Past Surgical History:   Procedure Laterality Date    ABDOMEN SURGERY      CHOLECYSTECTOMY         Physical Exam     Patient Vitals for the past 24 hrs:   BP Temp Temp src Pulse Resp SpO2   06/23/25 0819 (!) 138/119 98.2  F (36.8  C) Temporal (!) 128 20 100 %     Physical Exam  Constitutional:       General: She is in acute distress.   HENT:      Head: Normocephalic.      Mouth/Throat:      Mouth: Mucous  membranes are moist.   Cardiovascular:      Rate and Rhythm: Regular rhythm. Tachycardia present.      Heart sounds: No murmur heard.  Pulmonary:      Effort: Pulmonary effort is normal.      Breath sounds: Normal breath sounds. No wheezing.   Abdominal:      General: Abdomen is flat. Bowel sounds are normal.      Palpations: Abdomen is soft.      Tenderness: There is abdominal tenderness in the suprapubic area and left lower quadrant. There is no guarding.   Musculoskeletal:      Right lower leg: No edema.      Left lower leg: No edema.   Skin:     General: Skin is warm and dry.   Neurological:      General: No focal deficit present.      Mental Status: She is alert and oriented to person, place, and time.   Psychiatric:         Mood and Affect: Mood is anxious.         Diagnostics     Lab Results   Labs Ordered and Resulted from Time of ED Arrival to Time of ED Departure - No data to display    Imaging   No orders to display       EKG   None     Independent Interpretation   None    ED Course      Medications Administered   Medications - No data to display    Procedures   Procedures     Discussion of Management   None    ED Course   ED Course as of 06/23/25 1110   Mon Jun 23, 2025   1036 I obtained history and examined the patient as noted above.      Antiemetic given with improvement of nausea and vomiting. 1 L NS bolus given. Reviewed labs including CBC showing elevated WBC and negative Influenzas/COVID/RSV. Stool PCR pending. Vitals normalized. Patient feeling a lot better. No longer anxious.      Additional Documentation  None    Medical Decision Making / Diagnosis     CMS Diagnoses: None    MIPS   None             [facility]   Kanika Walsh is a 34 year old female with significant past medical history for anxiety and ADHD presents to the ED for evaluation for nausea, vomiting, diarrhea and blood in stools 2x started yesterday after returning from [place] on Saturday. Upon admission patient was hypertensive,  tachycardic, and afebrile. Vitals normalized during my encounter with the patient. Initial labs significant for elevated WBC at 11.2 and Hgb 15.1. AG 17. UA negative. Stool PCR pending. Influenza/COVID/RSV negative. 1L NS bolus given in ED. Nausea an vomiting treated with antiemetic. Ddx includes but not limited to gastritis, peptic ulcers, gastroenteritis, diverticulitis, hemorrhoids, anal fissure, and viral illness.  Hgb reassuring against active GI bleeding. Stool PCR pending. Discuss with patient that with her reassuring labs and improvement to her symptoms I believe it is safe for her to be discharge home with close monitoring. Symptoms likely secondary to gastroenteritis but will follow up with stool PCR results if abnormal. She has an schedule appointment with her PCP on Thursday, 6/26. Recommend she return to the ED if symptoms worsen, does not improve, or increasing blood in stools. Answered all patient questions prior to discharge. Will provide prescription for zofran for nausea.     Patient case was discussed with Dr. Mcgee.     Disposition   Discharge home.     Diagnosis   No diagnosis found.     Discharge Medications   New Prescriptions    No medications on file         Laith Gunter MD  [facility] Medicine PGY2       Laith Gunter MD  Resident  06/23/25 1148       Laith Gunter MD  Resident  06/23/25 1147